# Patient Record
Sex: MALE | Race: WHITE | NOT HISPANIC OR LATINO
[De-identification: names, ages, dates, MRNs, and addresses within clinical notes are randomized per-mention and may not be internally consistent; named-entity substitution may affect disease eponyms.]

---

## 2021-11-30 NOTE — PHYSICAL EXAM
[Well Developed] : well developed [Well Nourished] : well nourished [No Acute Distress] : no acute distress [Normal Conjunctiva] : normal conjunctiva [Normal Venous Pressure] : normal venous pressure [5th Left ICS - MCL] : palpated at the 5th LICS in the midclavicular line [Normal] : normal [No Precordial Heave] : no precordial heave was noted [Normal Rate] : normal [Rhythm Regular] : regular [Normal S1] : normal S1 [Normal S2] : normal S2 [No Murmur] : no murmurs heard [No Pitting Edema] : no pitting edema present [2+] : left 2+ [Clear Lung Fields] : clear lung fields [Good Air Entry] : good air entry [No Respiratory Distress] : no respiratory distress  [Soft] : abdomen soft [Non Tender] : non-tender [Normal Bowel Sounds] : normal bowel sounds [Normal Gait] : normal gait [No Edema] : no edema [No Varicosities] : no varicosities [No Rash] : no rash [Moves all extremities] : moves all extremities [No Focal Deficits] : no focal deficits [Alert and Oriented] : alert and oriented

## 2021-12-02 ENCOUNTER — APPOINTMENT (OUTPATIENT)
Dept: CARDIOLOGY | Facility: CLINIC | Age: 36
End: 2021-12-02
Payer: COMMERCIAL

## 2021-12-02 ENCOUNTER — APPOINTMENT (OUTPATIENT)
Dept: HEART AND VASCULAR | Facility: CLINIC | Age: 36
End: 2021-12-02

## 2021-12-02 VITALS — WEIGHT: 214 LBS | TEMPERATURE: 98 F | HEIGHT: 67 IN | BODY MASS INDEX: 33.59 KG/M2

## 2021-12-02 VITALS — SYSTOLIC BLOOD PRESSURE: 140 MMHG | DIASTOLIC BLOOD PRESSURE: 92 MMHG

## 2021-12-02 VITALS — DIASTOLIC BLOOD PRESSURE: 90 MMHG | SYSTOLIC BLOOD PRESSURE: 142 MMHG | HEART RATE: 73 BPM

## 2021-12-02 DIAGNOSIS — Z78.9 OTHER SPECIFIED HEALTH STATUS: ICD-10-CM

## 2021-12-02 PROCEDURE — 99202 OFFICE O/P NEW SF 15 MIN: CPT

## 2021-12-02 PROCEDURE — 93000 ELECTROCARDIOGRAM COMPLETE: CPT

## 2021-12-02 NOTE — ASSESSMENT
[FreeTextEntry1] : SVT: 3 episodes of SVT since May 2021. TTE structurally normal. No syncopal episodes and vagal maneuvers have not worked. 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 \par -Referral to EP with Dr. Wilson for possible EPS. 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 \par \par HTN: BP not at goal per ACC/AHA 2018 guidelines\par -Patient wishes to trial diet and exercise prior to medications.\par -Will recheck on next visit and discuss starting meds if not at goal. \par \par Follow up in 6 months\par

## 2021-12-02 NOTE — REASON FOR VISIT
[Other: ____] : [unfilled] [FreeTextEntry1] : Diagnostic Tests:\par --------------------\par EKG:\par 12/02/21-NSR. Borderline IVCD. \par 05/12/21-Sinus tachycardia. \par ---------------------\par Echo:\par 05/19/21-TTE: EF 60%. Normal TTE. \par

## 2021-12-02 NOTE — REVIEW OF SYSTEMS
[Negative] : Heme/Lymph [Palpitations] : palpitations [Dizziness] : dizziness [Syncope] : no syncope

## 2021-12-02 NOTE — HISTORY OF PRESENT ILLNESS
[FreeTextEntry1] : Mr. Moulton is a 35yo male with PMHx . His PCP is Dr. Jake Arroyo. He has been having palpitations since May 2021. His Apple watch has shown an atrial arrhythmia at rate 180 bpm. He feels lightheaded, dizzy and nauseous during these episodes. He has tried abortive measures, which have not worked. He recently moved and is establishing care. He has had EP follow up in the past and decision for watchful waiting approach was made. He denies CP, SOB, LE edema, orthopnea.

## 2022-01-13 ENCOUNTER — APPOINTMENT (OUTPATIENT)
Dept: HEART AND VASCULAR | Facility: CLINIC | Age: 37
End: 2022-01-13

## 2022-03-16 ENCOUNTER — APPOINTMENT (OUTPATIENT)
Dept: HEART AND VASCULAR | Facility: CLINIC | Age: 37
End: 2022-03-16

## 2022-04-05 ENCOUNTER — APPOINTMENT (OUTPATIENT)
Dept: HEART AND VASCULAR | Facility: CLINIC | Age: 37
End: 2022-04-05
Payer: COMMERCIAL

## 2022-04-05 ENCOUNTER — TRANSCRIPTION ENCOUNTER (OUTPATIENT)
Age: 37
End: 2022-04-05

## 2022-04-05 VITALS
TEMPERATURE: 96.7 F | WEIGHT: 206 LBS | BODY MASS INDEX: 32.33 KG/M2 | DIASTOLIC BLOOD PRESSURE: 58 MMHG | HEIGHT: 67 IN | HEART RATE: 82 BPM | SYSTOLIC BLOOD PRESSURE: 107 MMHG

## 2022-04-05 PROCEDURE — 93000 ELECTROCARDIOGRAM COMPLETE: CPT

## 2022-04-05 PROCEDURE — 99204 OFFICE O/P NEW MOD 45 MIN: CPT

## 2022-04-12 ENCOUNTER — TRANSCRIPTION ENCOUNTER (OUTPATIENT)
Age: 37
End: 2022-04-12

## 2022-08-01 ENCOUNTER — OUTPATIENT (OUTPATIENT)
Dept: OUTPATIENT SERVICES | Facility: HOSPITAL | Age: 37
LOS: 1 days | Discharge: HOME | End: 2022-08-01

## 2022-08-01 ENCOUNTER — RESULT REVIEW (OUTPATIENT)
Age: 37
End: 2022-08-01

## 2022-08-01 DIAGNOSIS — M54.12 RADICULOPATHY, CERVICAL REGION: ICD-10-CM

## 2022-08-01 PROCEDURE — 72141 MRI NECK SPINE W/O DYE: CPT | Mod: 26

## 2022-08-25 ENCOUNTER — APPOINTMENT (OUTPATIENT)
Dept: CARDIOLOGY | Facility: CLINIC | Age: 37
End: 2022-08-25

## 2022-08-25 VITALS
DIASTOLIC BLOOD PRESSURE: 98 MMHG | HEIGHT: 67 IN | BODY MASS INDEX: 30.61 KG/M2 | SYSTOLIC BLOOD PRESSURE: 132 MMHG | HEART RATE: 66 BPM | WEIGHT: 195 LBS

## 2022-08-25 VITALS — SYSTOLIC BLOOD PRESSURE: 134 MMHG | DIASTOLIC BLOOD PRESSURE: 98 MMHG

## 2022-08-25 VITALS — DIASTOLIC BLOOD PRESSURE: 88 MMHG | SYSTOLIC BLOOD PRESSURE: 132 MMHG

## 2022-08-25 PROCEDURE — 93000 ELECTROCARDIOGRAM COMPLETE: CPT

## 2022-08-25 PROCEDURE — 99213 OFFICE O/P EST LOW 20 MIN: CPT

## 2022-08-25 NOTE — REVIEW OF SYSTEMS
[Palpitations] : palpitations [Dizziness] : dizziness [Negative] : Heme/Lymph [Syncope] : no syncope

## 2022-08-25 NOTE — HISTORY OF PRESENT ILLNESS
[FreeTextEntry1] : Mr. Moulton is a 35yo male with no significant PMHx. His PCP is Dr. Jake Arroyo. He has been having palpitations since May 2021. His Apple watch has shown an atrial arrhythmia at rate 180 bpm. He feels lightheaded, dizzy and nauseous during these episodes. He has tried abortive measures, which have not worked. He recently moved and is establishing care. He has had EP follow up in the past and decision for watchful waiting approach was made. He denies CP, SOB, LE edema, orthopnea. \par -Patient evaluated by EP with Dr. Waters and discussed possible options for AVNRT work up and management. \par \par 08/25/2022: Patient presents for cardiology F/U visit, states that palpitations are under control with Toprol 25 mg daily. He has not made a decision about proceeding with ablation yet.

## 2022-08-25 NOTE — ASSESSMENT
[FreeTextEntry1] : SVT: 3 episodes of SVT since May 2021. TTE structurally normal. No syncopal episodes and vagal maneuvers have not worked. 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 \par - reports a good control of palpitations on Toprol \par \par \par HTN: BP near goal per ACC/AHA 2018 guidelines\par -Patient wishes to trial diet and exercise prior to medications.\par -Will recheck on next visit and discuss starting meds if not at goal. \par \par - will order fasting blood work.\par \par Follow up in 6 months\par

## 2022-08-25 NOTE — REASON FOR VISIT
[Other: ____] : [unfilled] [FreeTextEntry1] : Diagnostic Tests:\par --------------------\par EKG:\par 08/25/22-NSR. Incomplete RBBB. \par 12/02/21-NSR. Borderline IVCD. \par 05/12/21-Sinus tachycardia. \par ---------------------\par Echo:\par 05/19/21-TTE: EF 60%. Normal TTE. \par

## 2022-09-26 ENCOUNTER — APPOINTMENT (OUTPATIENT)
Dept: NEUROSURGERY | Facility: CLINIC | Age: 37
End: 2022-09-26

## 2022-09-26 VITALS — BODY MASS INDEX: 28.79 KG/M2 | HEIGHT: 68 IN | WEIGHT: 190 LBS

## 2022-09-26 DIAGNOSIS — Z82.49 FAMILY HISTORY OF ISCHEMIC HEART DISEASE AND OTHER DISEASES OF THE CIRCULATORY SYSTEM: ICD-10-CM

## 2022-09-26 PROCEDURE — 99204 OFFICE O/P NEW MOD 45 MIN: CPT

## 2022-09-26 NOTE — ASSESSMENT
[FreeTextEntry1] : This is a 35 yo M who presents for neurosurgical consultation with regards to left C5-6 disc displacement resulting in a C6 radiculopathy to the left upper extremity. He has noted considerable relief with conservative efforts and he is to continue monitoring his condition. If his symptoms were to return, he can consider a MARLINE x1 and would be referred to Dr. Elizabeth for treatment. No neurosurgical intervention to be considered at this time.\par \par He can contact us with questions or concerns at any time and will return to the office as needed moving forward.\par \par Juliana Matias PA-C\par Maggie Diehl MD

## 2022-09-26 NOTE — HISTORY OF PRESENT ILLNESS
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 Pain has been present for over 2 years; no inciting event or trauma reported. Isolated neck pain noted along with numbness/tingling into the left shoulder with radicular features into the 1-2 digits of the left hand.  Severe bout of pain noted on 7/4/2022. During that time he had appreciated severe weakness of the left upper extremity with fine manipulation deficits. MR C Spine obtained along with trial of NSAID/Steroid and gabapentin issue. He notes a near complete resolution of pain at this time.\par \par MR C spine SI 8/2022- left C5-6 disc displacement with moderate-severe left neuroforaminal stenosis.\par \par PHYSICAL EXAM: \par Constitutional: Well appearing, no distress\par HEENT: Normocephalic Atraumatic\par Psychiatric: Alert and oriented to all spheres, normal mood\par Pulmonary: No respiratory distress\par \par Neurologic: \par CN II-XII grossly intact\par Palpation: no pain to palpation \par Strength: 4+/5 left hand  strength\par Sensation: Full sensation to light touch in all extremities\par Reflexes: \par  2+ biceps\par  2+ triceps\par \par  No Childress's\par  No clonus\par \par ROM intact\par \par Gait: steady, walking w/o assistance.\par

## 2023-02-03 LAB
ALBUMIN SERPL ELPH-MCNC: 5 G/DL
ALP BLD-CCNC: 56 U/L
ALT SERPL-CCNC: 55 U/L
ANION GAP SERPL CALC-SCNC: 15 MMOL/L
AST SERPL-CCNC: 32 U/L
BASOPHILS # BLD AUTO: 0.03 K/UL
BASOPHILS NFR BLD AUTO: 0.5 %
BILIRUB SERPL-MCNC: 1.2 MG/DL
BUN SERPL-MCNC: 9 MG/DL
CALCIUM SERPL-MCNC: 9.5 MG/DL
CHLORIDE SERPL-SCNC: 104 MMOL/L
CHOLEST SERPL-MCNC: 197 MG/DL
CO2 SERPL-SCNC: 20 MMOL/L
CREAT SERPL-MCNC: 0.9 MG/DL
EGFR: 112 ML/MIN/1.73M2
EOSINOPHIL # BLD AUTO: 0.13 K/UL
EOSINOPHIL NFR BLD AUTO: 2.2 %
GLUCOSE SERPL-MCNC: 88 MG/DL
HCT VFR BLD CALC: 41.9 %
HDLC SERPL-MCNC: 65 MG/DL
HGB BLD-MCNC: 14.5 G/DL
IMM GRANULOCYTES NFR BLD AUTO: 0.2 %
LDLC SERPL CALC-MCNC: 116 MG/DL
LYMPHOCYTES # BLD AUTO: 1.78 K/UL
LYMPHOCYTES NFR BLD AUTO: 30 %
MAN DIFF?: NORMAL
MCHC RBC-ENTMCNC: 30.6 PG
MCHC RBC-ENTMCNC: 34.6 G/DL
MCV RBC AUTO: 88.4 FL
MONOCYTES # BLD AUTO: 0.77 K/UL
MONOCYTES NFR BLD AUTO: 13 %
NEUTROPHILS # BLD AUTO: 3.21 K/UL
NEUTROPHILS NFR BLD AUTO: 54.1 %
NONHDLC SERPL-MCNC: 132 MG/DL
PLATELET # BLD AUTO: 330 K/UL
POTASSIUM SERPL-SCNC: 4.2 MMOL/L
PROT SERPL-MCNC: 7.8 G/DL
RBC # BLD: 4.74 M/UL
RBC # FLD: 12.4 %
SODIUM SERPL-SCNC: 139 MMOL/L
TRIGL SERPL-MCNC: 81 MG/DL
WBC # FLD AUTO: 5.93 K/UL

## 2023-02-08 ENCOUNTER — APPOINTMENT (OUTPATIENT)
Dept: CARDIOLOGY | Facility: CLINIC | Age: 38
End: 2023-02-08
Payer: COMMERCIAL

## 2023-02-08 VITALS
HEIGHT: 68 IN | DIASTOLIC BLOOD PRESSURE: 82 MMHG | BODY MASS INDEX: 31.83 KG/M2 | WEIGHT: 210 LBS | SYSTOLIC BLOOD PRESSURE: 142 MMHG | HEART RATE: 9 BPM

## 2023-02-08 VITALS — SYSTOLIC BLOOD PRESSURE: 126 MMHG | DIASTOLIC BLOOD PRESSURE: 88 MMHG

## 2023-02-08 DIAGNOSIS — M54.12 RADICULOPATHY, CERVICAL REGION: ICD-10-CM

## 2023-02-08 PROCEDURE — 93000 ELECTROCARDIOGRAM COMPLETE: CPT

## 2023-02-08 PROCEDURE — 99213 OFFICE O/P EST LOW 20 MIN: CPT

## 2023-02-08 NOTE — HISTORY OF PRESENT ILLNESS
[FreeTextEntry1] : Mr. Moulton is a 38yo male with no significant PMHx. His PCP is Dr. Jake Arroyo. He has been having palpitations since May 2021. His Apple watch has shown an atrial arrhythmia at rate 180 bpm. He feels lightheaded, dizzy and nauseous during these episodes. He has tried abortive measures, which have not worked. He recently moved and is establishing care. He has had EP follow up in the past and decision for watchful waiting approach was made. He denies CP, SOB, LE edema, orthopnea. \par -Patient evaluated by EP with Dr. Waters and discussed possible options for AVNRT work up and management. \par \par 08/25/2022: Patient presents for cardiology F/U visit, states that palpitations are under control with Toprol 25 mg daily. He has not made a decision about proceeding with ablation yet. \par 02/08/23-Patient is feeling well. Denies further palpitations. Will defer ablation at this time. With left cervical radiculopathy, improving with conservative therapy.

## 2023-02-08 NOTE — REASON FOR VISIT
[Other: ____] : [unfilled] [FreeTextEntry1] : Diagnostic Tests:\par --------------------\par EKG:\par 02/08/23-NSR with sinus arrhythmia. Incomplete RBBB. \par 08/25/22-NSR. Incomplete RBBB. \par 12/02/21-NSR. Borderline IVCD. \par 05/12/21-Sinus tachycardia. \par ---------------------\par Echo:\par 05/19/21-TTE: EF 60%. Normal TTE. \par

## 2023-02-08 NOTE — REVIEW OF SYSTEMS
[Dizziness] : dizziness [Negative] : Heme/Lymph [Palpitations] : no palpitations [Syncope] : no syncope

## 2023-02-09 ENCOUNTER — APPOINTMENT (OUTPATIENT)
Dept: UROLOGY | Facility: CLINIC | Age: 38
End: 2023-02-09

## 2023-04-13 ENCOUNTER — APPOINTMENT (OUTPATIENT)
Dept: CARDIOLOGY | Facility: CLINIC | Age: 38
End: 2023-04-13
Payer: COMMERCIAL

## 2023-04-13 PROCEDURE — 93228 REMOTE 30 DAY ECG REV/REPORT: CPT

## 2023-04-13 NOTE — ASSESSMENT
[FreeTextEntry1] : SVT: 3 episodes of SVT since May 2021. TTE structurally normal. No syncopal episodes and vagal maneuvers have not worked. 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 \par - reports a good control of palpitations on Toprol.  \par \par HTN: BP near goal per ACC/AHA 2018 guidelines\par -Patient wishes to trial diet and exercise prior to medications.\par -Will recheck on next visit and discuss starting meds if not at goal. \par -Check TTE prior to next visit. \par \par HLD: , TG 81, HDL 65 and \par -Improved from previous. \par -Discussed therapeutic lifestyle changes to promote improved lipid metabolism. \par \par Transaminitis: ALT 55\par -Recent GI infection.\par -Recheck CMP. \par \par \par Follow up in 6 months\par  Niacinamide Pregnancy And Lactation Text: These medications are considered safe during pregnancy.

## 2023-09-14 ENCOUNTER — OUTPATIENT (OUTPATIENT)
Dept: OUTPATIENT SERVICES | Facility: HOSPITAL | Age: 38
LOS: 1 days | End: 2023-09-14
Payer: COMMERCIAL

## 2023-09-14 ENCOUNTER — APPOINTMENT (OUTPATIENT)
Dept: CV DIAGNOSITCS | Facility: HOSPITAL | Age: 38
End: 2023-09-14
Payer: COMMERCIAL

## 2023-09-14 DIAGNOSIS — I47.1 SUPRAVENTRICULAR TACHYCARDIA: ICD-10-CM

## 2023-09-14 PROCEDURE — 93306 TTE W/DOPPLER COMPLETE: CPT | Mod: 26

## 2023-09-14 PROCEDURE — 93306 TTE W/DOPPLER COMPLETE: CPT

## 2023-09-15 DIAGNOSIS — I47.1 SUPRAVENTRICULAR TACHYCARDIA: ICD-10-CM

## 2023-09-22 ENCOUNTER — APPOINTMENT (OUTPATIENT)
Dept: CARDIOLOGY | Facility: CLINIC | Age: 38
End: 2023-09-22

## 2023-09-26 ENCOUNTER — RX RENEWAL (OUTPATIENT)
Age: 38
End: 2023-09-26

## 2023-09-28 ENCOUNTER — APPOINTMENT (OUTPATIENT)
Dept: CARDIOLOGY | Facility: CLINIC | Age: 38
End: 2023-09-28

## 2023-11-10 NOTE — HISTORY OF PRESENT ILLNESS
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  First episode in 5/2021, with a total of five episodes altogether.  Episodes are 5-60 minutes in duration.  The only trigger he has identified is that palpitations occur after bending over.  One episode when he presented to ED in [place] but the arrhythmia terminated by the time he arrived.  He captured a narrow complex SVT @ 180 bpm on Apple watch 3/31/21. Episodes have not terminated with vagal maneuvers.  Short RP tachycardia suggestive of AVNRT.  \par \par Previously evaluated by Sammy Chase.  \par \par TSH 0.95 5/2021\par LVEF 60%, no significant valvular abnormalities

## 2023-11-14 ENCOUNTER — APPOINTMENT (OUTPATIENT)
Dept: CARDIOLOGY | Facility: CLINIC | Age: 38
End: 2023-11-14
Payer: COMMERCIAL

## 2023-11-14 VITALS
DIASTOLIC BLOOD PRESSURE: 88 MMHG | HEART RATE: 66 BPM | BODY MASS INDEX: 31.98 KG/M2 | OXYGEN SATURATION: 98 % | SYSTOLIC BLOOD PRESSURE: 130 MMHG | WEIGHT: 211 LBS | HEIGHT: 68 IN

## 2023-11-14 DIAGNOSIS — I47.10 SUPRAVENTRICULAR TACHYCARDIA, UNSPECIFIED: ICD-10-CM

## 2023-11-14 DIAGNOSIS — I10 ESSENTIAL (PRIMARY) HYPERTENSION: ICD-10-CM

## 2023-11-14 DIAGNOSIS — E78.5 HYPERLIPIDEMIA, UNSPECIFIED: ICD-10-CM

## 2023-11-14 DIAGNOSIS — R00.2 PALPITATIONS: ICD-10-CM

## 2023-11-14 DIAGNOSIS — R74.01 ELEVATION OF LEVELS OF LIVER TRANSAMINASE LEVELS: ICD-10-CM

## 2023-11-14 DIAGNOSIS — I49.3 VENTRICULAR PREMATURE DEPOLARIZATION: ICD-10-CM

## 2023-11-14 PROCEDURE — 99214 OFFICE O/P EST MOD 30 MIN: CPT

## 2023-12-26 ENCOUNTER — RX RENEWAL (OUTPATIENT)
Age: 38
End: 2023-12-26

## 2023-12-26 RX ORDER — METOPROLOL SUCCINATE 50 MG/1
50 TABLET, EXTENDED RELEASE ORAL
Qty: 90 | Refills: 0 | Status: ACTIVE | COMMUNITY
Start: 2022-06-14 | End: 1900-01-01

## 2024-01-19 ENCOUNTER — EMERGENCY (EMERGENCY)
Facility: HOSPITAL | Age: 39
LOS: 0 days | Discharge: HOME | End: 2024-01-19
Attending: STUDENT IN AN ORGANIZED HEALTH CARE EDUCATION/TRAINING PROGRAM
Payer: COMMERCIAL

## 2024-01-19 VITALS
RESPIRATION RATE: 20 BRPM | DIASTOLIC BLOOD PRESSURE: 94 MMHG | SYSTOLIC BLOOD PRESSURE: 188 MMHG | OXYGEN SATURATION: 100 % | HEIGHT: 67 IN | HEART RATE: 215 BPM | WEIGHT: 205.03 LBS | TEMPERATURE: 97 F

## 2024-01-19 VITALS — HEART RATE: 97 BPM | SYSTOLIC BLOOD PRESSURE: 115 MMHG | DIASTOLIC BLOOD PRESSURE: 67 MMHG

## 2024-01-19 DIAGNOSIS — R00.2 PALPITATIONS: ICD-10-CM

## 2024-01-19 DIAGNOSIS — I10 ESSENTIAL (PRIMARY) HYPERTENSION: ICD-10-CM

## 2024-01-19 DIAGNOSIS — I47.10 SUPRAVENTRICULAR TACHYCARDIA, UNSPECIFIED: ICD-10-CM

## 2024-01-19 DIAGNOSIS — R94.31 ABNORMAL ELECTROCARDIOGRAM [ECG] [EKG]: ICD-10-CM

## 2024-01-19 PROCEDURE — 93010 ELECTROCARDIOGRAM REPORT: CPT

## 2024-01-19 PROCEDURE — 99284 EMERGENCY DEPT VISIT MOD MDM: CPT

## 2024-01-19 PROCEDURE — 99283 EMERGENCY DEPT VISIT LOW MDM: CPT | Mod: 25

## 2024-01-19 PROCEDURE — 93005 ELECTROCARDIOGRAM TRACING: CPT

## 2024-01-19 RX ORDER — ADENOSINE 3 MG/ML
6 INJECTION INTRAVENOUS ONCE
Refills: 0 | Status: DISCONTINUED | OUTPATIENT
Start: 2024-01-19 | End: 2024-01-19

## 2024-01-19 NOTE — ED PROVIDER NOTE - PHYSICAL EXAMINATION
VITAL SIGNS: I have reviewed nursing notes and confirm.  CONSTITUTIONAL: well-appearing, non-toxic, NAD  SKIN: Warm dry, normal skin turgor  HEAD: NCAT  EYES: EOMI, PERRLA, no scleral icterus  ENT: Moist mucous membranes, normal pharynx with no erythema or exudates  NECK: Supple; non tender. Full ROM. No cervical LAD  CARD: tachycardic, RR, no murmurs, rubs or gallops  RESP: clear to ausculation b/l.  No rales, rhonchi, or wheezing.  ABD: soft, + BS, non-tender, non-distended, no rebound or guarding. No CVA tenderness  EXT: Full ROM, no bony tenderness, no pedal edema, no calf tenderness  NEURO: normal motor. normal sensory. CN II-XII intact. Cerebellar testing normal. Normal gait.  PSYCH: Cooperative, appropriate.

## 2024-01-19 NOTE — ED PROVIDER NOTE - CLINICAL SUMMARY MEDICAL DECISION MAKING FREE TEXT BOX
I, Abelardo Molina, have personally seen and examined this patient. I have fully participated in the care of this patient. I have made amendments to the documentation where appropriate and otherwise agree with the history, physical exam, and plan as documented by the resident    Patient past medical history of SVT on metoprolol palpitations patient SVT on EKG started 2030 minutes prior to arrival denies any other issues follows with cardiologist recommends ablation patient performed vagal maneuver with successful cardioversion will discharge

## 2024-01-19 NOTE — ED PROVIDER NOTE - OBJECTIVE STATEMENT
38y male with PMHx of HTN on metoprolol who presents to the ED for palpitations, found to be in SVT. Reports he was getting ready for work, when he felt palpitations.  Palpitations started 20 to 30 minutes prior to arrival.  Denies recent illness, fevers, chills, chest pain, shortness of breath, nausea, vomiting, diarrhea, abdominal pain, weakness, numbness, pedal edema, recent travel, known sick contacts.  Denies alcohol use, substance use, tobacco use.

## 2024-01-19 NOTE — ED ADULT NURSE NOTE - NSFALLRISKASMT_ED_ALL_ED_DT
19-Jan-2024 22:08
Coronary artery disease involving native coronary artery of native heart, angina presence unspecifie

## 2024-01-19 NOTE — ED PROVIDER NOTE - NSFOLLOWUPINSTRUCTIONS_ED_ALL_ED_FT
Supraventricular Tachycardia    WHAT YOU NEED TO KNOW:    Supraventricular tachycardia (SVT) is a condition that causes your heart to beat much faster than it should. SVT is a type of abnormal heart rhythm, called an arrhythmia, that starts in the upper part of your heart. It may last a few seconds or hours to several days.    DISCHARGE INSTRUCTIONS:  Call 911 for the following:  You have any of the following signs of a heart attack:  Squeezing, pressure, or pain in your chest  You may also have any of the following:  Discomfort or pain in your back, neck, jaw, stomach, or arm  Shortness of breath  Nausea or vomiting  Lightheadedness or a sudden cold sweat  Seek care immediately if:  You have dizziness, lightheadedness, or feel faint.  You have sudden numbness or weakness in your arms or legs.  Contact your healthcare provider if:  Your symptoms get worse, or you have new symptoms.  You have swelling in your ankles or feet.  You have questions or concerns about your condition or care.  Medicines:  Medicines can help control your heart rate and rhythm. You may need more than one medicine to treat your symptoms.  Take your medicine as directed. Contact your healthcare provider if you think your medicine is not helping or if you have side effects. Tell him or her if you are allergic to any medicine. Keep a list of the medicines, vitamins, and herbs you take. Include the amounts, and when and why you take them. Bring the list or the pill bottles to follow-up visits. Carry your medicine list with you in case of an emergency.  How to manage or prevent SVT:  Perform vagal maneuvers as directed when you have symptoms of SVT. Lie down flat and bear down like you are having a bowel movement. Do this for 10 to 30 seconds.  Do not drink caffeine or alcohol. These can increase your risk for SVT.  Keep a record of your symptoms. Write down what you ate or what you were doing before an episode of SVT. Also write down anything you did to make the SVT stop. Bring your record to follow up visits with your healthcare provider.  Eat heart-healthy foods. These include fruits, vegetables, whole-grain breads, low-fat dairy products, beans, lean meats, and fish. Replace butter and margarine with heart-healthy oils such as olive oil and canola oil.  Exercise regularly and maintain a healthy weight. Ask about the best exercise plan for you. Ask your healthcare provider how much you should weigh. Ask him to help you create a weight loss plan if you are overweight.  Do not smoke. Nicotine and other chemicals in cigarettes and cigars can cause heart and lung damage. Ask your healthcare provider for information if you currently smoke and need help to quit. E-cigarettes or smokeless tobacco still contain nicotine. Talk to your healthcare provider before you use these products.  Follow up with your healthcare provider as directed:  Write down your questions so you remember to ask them during your visits.

## 2024-01-19 NOTE — ED ADULT NURSE NOTE - NSFALLUNIVINTERV_ED_ALL_ED
Bed/Stretcher in lowest position, wheels locked, appropriate side rails in place/Call bell, personal items and telephone in reach/Instruct patient to call for assistance before getting out of bed/chair/stretcher/Non-slip footwear applied when patient is off stretcher/[employer] to call system/Physically safe environment - no spills, clutter or unnecessary equipment/Purposeful proactive rounding/Room/bathroom lighting operational, light cord in reach

## 2024-01-19 NOTE — ED PROVIDER NOTE - PATIENT PORTAL LINK FT
You can access the FollowMyHealth Patient Portal offered by [facility] by registering at the following website: [email or web address]. By joining [facility]’s FollowMyHealth portal, you will also be able to view your health information using other applications (apps) compatible with our system.

## 2024-01-19 NOTE — ED ADULT NURSE NOTE - MODE OF DISCHARGE
Please take a list of all of your medications and discharge paperwork with you to all of your follow-up medical visits  Please take all of your medications as directed  Please call your family doctor or return to the ER if you have increased shortness of breath, chest pain, fevers, chills, nausea, vomiting, diarrhea, or any other worsening symptoms 
Ambulatory

## 2024-01-22 LAB
ALBUMIN SERPL ELPH-MCNC: 4.9 G/DL
ALP BLD-CCNC: 64 U/L
ALT SERPL-CCNC: 35 U/L
ANION GAP SERPL CALC-SCNC: 21 MMOL/L
AST SERPL-CCNC: 29 U/L
BASOPHILS # BLD AUTO: 0.07 K/UL
BASOPHILS NFR BLD AUTO: 0.6 %
BILIRUB SERPL-MCNC: 1 MG/DL
BUN SERPL-MCNC: 13 MG/DL
CALCIUM SERPL-MCNC: 10.1 MG/DL
CHLORIDE SERPL-SCNC: 101 MMOL/L
CHOLEST SERPL-MCNC: 243 MG/DL
CO2 SERPL-SCNC: 17 MMOL/L
CREAT SERPL-MCNC: 1.4 MG/DL
EGFR: 66 ML/MIN/1.73M2
EOSINOPHIL # BLD AUTO: 0.19 K/UL
EOSINOPHIL NFR BLD AUTO: 1.6 %
ESTIMATED AVERAGE GLUCOSE: 108 MG/DL
GLUCOSE SERPL-MCNC: 140 MG/DL
HBA1C MFR BLD HPLC: 5.4 %
HCT VFR BLD CALC: 43.7 %
HDLC SERPL-MCNC: 60 MG/DL
HGB BLD-MCNC: 15.6 G/DL
LDLC SERPL CALC-MCNC: 148 MG/DL
LYMPHOCYTES # BLD AUTO: 5.12 K/UL
LYMPHOCYTES NFR BLD AUTO: 42.9 %
MAN DIFF?: NORMAL
MCHC RBC-ENTMCNC: 30.7 PG
MCHC RBC-ENTMCNC: 35.7 G/DL
MCV RBC AUTO: 86 FL
MONOCYTES # BLD AUTO: 1.47 K/UL
MONOCYTES NFR BLD AUTO: 12.3 %
NEUTROPHILS # BLD AUTO: 5.05 K/UL
NEUTROPHILS NFR BLD AUTO: 42.3 %
NONHDLC SERPL-MCNC: 183 MG/DL
PLATELET # BLD AUTO: 446 K/UL
POTASSIUM SERPL-SCNC: 3.8 MMOL/L
PROT SERPL-MCNC: 8 G/DL
RBC # BLD: 5.08 M/UL
RBC # FLD: 11.9 %
SODIUM SERPL-SCNC: 139 MMOL/L
TRIGL SERPL-MCNC: 179 MG/DL
TSH SERPL-ACNC: 2.48 UIU/ML
WBC # FLD AUTO: 11.9 K/UL

## 2024-05-21 ENCOUNTER — APPOINTMENT (OUTPATIENT)
Dept: CARDIOLOGY | Facility: CLINIC | Age: 39
End: 2024-05-21

## 2024-11-27 ENCOUNTER — RX RENEWAL (OUTPATIENT)
Age: 39
End: 2024-11-27

## 2024-12-31 ENCOUNTER — NON-APPOINTMENT (OUTPATIENT)
Age: 39
End: 2024-12-31

## 2024-12-31 ENCOUNTER — APPOINTMENT (OUTPATIENT)
Dept: OTOLARYNGOLOGY | Facility: CLINIC | Age: 39
End: 2024-12-31
Payer: COMMERCIAL

## 2024-12-31 VITALS — WEIGHT: 211 LBS | HEIGHT: 67 IN | BODY MASS INDEX: 33.12 KG/M2

## 2024-12-31 DIAGNOSIS — K21.9 GASTRO-ESOPHAGEAL REFLUX DISEASE W/OUT ESOPHAGITIS: ICD-10-CM

## 2024-12-31 DIAGNOSIS — K14.8 OTHER DISEASES OF TONGUE: ICD-10-CM

## 2024-12-31 PROCEDURE — 99204 OFFICE O/P NEW MOD 45 MIN: CPT | Mod: 25

## 2024-12-31 PROCEDURE — 31575 DIAGNOSTIC LARYNGOSCOPY: CPT

## 2024-12-31 RX ORDER — PANTOPRAZOLE 40 MG/1
40 TABLET, DELAYED RELEASE ORAL
Qty: 30 | Refills: 3 | Status: ACTIVE | COMMUNITY
Start: 2024-12-31 | End: 1900-01-01

## 2024-12-31 RX ORDER — FAMOTIDINE 40 MG/1
40 TABLET, FILM COATED ORAL
Qty: 30 | Refills: 3 | Status: ACTIVE | COMMUNITY
Start: 2024-12-31 | End: 1900-01-01

## 2024-12-31 RX ORDER — TRIAMCINOLONE ACETONIDE 1 MG/G
0.1 PASTE DENTAL 3 TIMES DAILY
Qty: 5 | Refills: 5 | Status: ACTIVE | COMMUNITY
Start: 2024-12-31 | End: 1900-01-01

## 2025-03-13 ENCOUNTER — APPOINTMENT (OUTPATIENT)
Dept: OTOLARYNGOLOGY | Facility: CLINIC | Age: 40
End: 2025-03-13

## 2025-09-11 ENCOUNTER — NON-APPOINTMENT (OUTPATIENT)
Age: 40
End: 2025-09-11

## 2025-09-11 ENCOUNTER — APPOINTMENT (OUTPATIENT)
Dept: CARDIOLOGY | Facility: CLINIC | Age: 40
End: 2025-09-11
Payer: COMMERCIAL

## 2025-09-11 VITALS
WEIGHT: 194 LBS | SYSTOLIC BLOOD PRESSURE: 130 MMHG | HEIGHT: 67 IN | HEART RATE: 69 BPM | DIASTOLIC BLOOD PRESSURE: 80 MMHG | BODY MASS INDEX: 30.45 KG/M2

## 2025-09-11 DIAGNOSIS — I10 ESSENTIAL (PRIMARY) HYPERTENSION: ICD-10-CM

## 2025-09-11 DIAGNOSIS — E78.5 HYPERLIPIDEMIA, UNSPECIFIED: ICD-10-CM

## 2025-09-11 DIAGNOSIS — I47.10 SUPRAVENTRICULAR TACHYCARDIA, UNSPECIFIED: ICD-10-CM

## 2025-09-11 DIAGNOSIS — I49.3 VENTRICULAR PREMATURE DEPOLARIZATION: ICD-10-CM

## 2025-09-11 DIAGNOSIS — R74.01 ELEVATION OF LEVELS OF LIVER TRANSAMINASE LEVELS: ICD-10-CM

## 2025-09-11 DIAGNOSIS — R00.2 PALPITATIONS: ICD-10-CM

## 2025-09-11 PROCEDURE — 93000 ELECTROCARDIOGRAM COMPLETE: CPT

## 2025-09-11 PROCEDURE — G2211 COMPLEX E/M VISIT ADD ON: CPT

## 2025-09-11 PROCEDURE — 99214 OFFICE O/P EST MOD 30 MIN: CPT
